# Patient Record
Sex: MALE | Race: WHITE | NOT HISPANIC OR LATINO | Employment: FULL TIME | URBAN - METROPOLITAN AREA
[De-identification: names, ages, dates, MRNs, and addresses within clinical notes are randomized per-mention and may not be internally consistent; named-entity substitution may affect disease eponyms.]

---

## 2022-08-14 ENCOUNTER — APPOINTMENT (EMERGENCY)
Dept: RADIOLOGY | Facility: HOSPITAL | Age: 42
End: 2022-08-14
Payer: COMMERCIAL

## 2022-08-14 ENCOUNTER — HOSPITAL ENCOUNTER (EMERGENCY)
Facility: HOSPITAL | Age: 42
Discharge: HOME | End: 2022-08-14
Attending: EMERGENCY MEDICINE
Payer: COMMERCIAL

## 2022-08-14 VITALS
RESPIRATION RATE: 18 BRPM | HEART RATE: 97 BPM | OXYGEN SATURATION: 98 % | BODY MASS INDEX: 34.65 KG/M2 | TEMPERATURE: 97.4 F | DIASTOLIC BLOOD PRESSURE: 102 MMHG | WEIGHT: 270 LBS | HEIGHT: 74 IN | SYSTOLIC BLOOD PRESSURE: 140 MMHG

## 2022-08-14 DIAGNOSIS — M25.551 RIGHT HIP PAIN: Primary | ICD-10-CM

## 2022-08-14 DIAGNOSIS — M70.60 TROCHANTERIC BURSITIS: ICD-10-CM

## 2022-08-14 DIAGNOSIS — M76.30 IT BAND SYNDROME: ICD-10-CM

## 2022-08-14 PROCEDURE — 99283 EMERGENCY DEPT VISIT LOW MDM: CPT

## 2022-08-14 PROCEDURE — 96372 THER/PROPH/DIAG INJ SC/IM: CPT

## 2022-08-14 PROCEDURE — 99284 EMERGENCY DEPT VISIT MOD MDM: CPT | Performed by: EMERGENCY MEDICINE

## 2022-08-14 PROCEDURE — 73502 X-RAY EXAM HIP UNI 2-3 VIEWS: CPT

## 2022-08-14 PROCEDURE — 20610 DRAIN/INJ JOINT/BURSA W/O US: CPT | Performed by: EMERGENCY MEDICINE

## 2022-08-14 RX ORDER — KETOROLAC TROMETHAMINE 30 MG/ML
15 INJECTION, SOLUTION INTRAMUSCULAR; INTRAVENOUS ONCE
Status: COMPLETED | OUTPATIENT
Start: 2022-08-14 | End: 2022-08-14

## 2022-08-14 RX ORDER — TESTOSTERONE 20.25 MG/1.25G
GEL TOPICAL DAILY
COMMUNITY
Start: 2022-05-01

## 2022-08-14 RX ORDER — LIDOCAINE HYDROCHLORIDE 10 MG/ML
5 INJECTION, SOLUTION EPIDURAL; INFILTRATION; INTRACAUDAL; PERINEURAL ONCE
Status: COMPLETED | OUTPATIENT
Start: 2022-08-14 | End: 2022-08-14

## 2022-08-14 RX ORDER — IBUPROFEN 800 MG/1
800 TABLET ORAL
COMMUNITY
Start: 2021-11-30 | End: 2022-11-30

## 2022-08-14 RX ORDER — LIRAGLUTIDE 6 MG/ML
INJECTION, SOLUTION SUBCUTANEOUS
COMMUNITY
Start: 2022-05-01 | End: 2023-05-29

## 2022-08-14 RX ORDER — TRIAMCINOLONE ACETONIDE 40 MG/ML
40 INJECTION, SUSPENSION INTRA-ARTICULAR; INTRAMUSCULAR ONCE
Status: COMPLETED | OUTPATIENT
Start: 2022-08-14 | End: 2022-08-14

## 2022-08-14 RX ORDER — CYCLOBENZAPRINE HCL 5 MG
1 TABLET ORAL 3 TIMES DAILY PRN
COMMUNITY
Start: 2022-05-01 | End: 2023-05-01

## 2022-08-14 RX ADMIN — KETOROLAC TROMETHAMINE 15 MG: 30 INJECTION, SOLUTION INTRAMUSCULAR; INTRAVENOUS at 14:26

## 2022-08-14 RX ADMIN — TRIAMCINOLONE ACETONIDE 40 MG: 40 INJECTION, SUSPENSION INTRA-ARTICULAR; INTRAMUSCULAR at 14:26

## 2022-08-14 RX ADMIN — LIDOCAINE HYDROCHLORIDE 5 ML: 10 INJECTION, SOLUTION EPIDURAL; INFILTRATION; INTRACAUDAL at 14:26

## 2022-08-14 NOTE — ED PROVIDER NOTES
History  Chief Complaint   Patient presents with    Hip Pain     Patient presents to the ED with c o right hip pain, denies injury and states he has been dealing with the pain for over a year  Previously x rayed  States pain worsened today and has taken ibuprofen and flexeril and applied lidocaine patch with no relief      53-year-old male history of diabetes presenting due to right hip pain  Patient states he has been dealing with this pain for a year and seems to worsened over the past week  Pain goes from the lateral aspect of his right upper leg and at times he feels it on the right side his right leg  Has taken some over-the-counter medication with minimal relief  Denies any injury or trauma  Denies any fever or chills  Denies any pain radiating to the groin testicle or penis  Denies any numbness or tingling in extremities  Prior to Admission Medications   Prescriptions Last Dose Informant Patient Reported? Taking? Dapagliflozin Propanediol 10 MG TABS   Yes Yes   Sig: Take 10 mg by mouth daily   cyclobenzaprine (FLEXERIL) 5 mg tablet   Yes Yes   Sig: Take 1 tablet by mouth 3 (three) times a day as needed   ibuprofen (MOTRIN) 800 mg tablet   Yes Yes   Sig: Take 800 mg by mouth   liraglutide (Saxenda) injection   Yes Yes   Sig: Inject under the skin   testosterone (ANDROGEL) 1 62 %   Yes Yes   Sig: Place on the skin daily      Facility-Administered Medications: None       History reviewed  No pertinent past medical history  History reviewed  No pertinent surgical history  History reviewed  No pertinent family history  I have reviewed and agree with the history as documented  E-Cigarette/Vaping     E-Cigarette/Vaping Substances     Social History     Tobacco Use    Smoking status: Never Smoker   Substance Use Topics    Alcohol use: Not Currently    Drug use: Not Currently       Review of Systems   Constitutional: Negative for fatigue and fever     HENT: Negative for congestion and trouble swallowing  Respiratory: Negative for cough, chest tightness and shortness of breath  Cardiovascular: Negative for chest pain  Gastrointestinal: Negative for abdominal pain, diarrhea, nausea and vomiting  Genitourinary: Negative for flank pain  Musculoskeletal: Negative for back pain and gait problem  Right hip pain   Skin: Negative for rash and wound  Neurological: Negative for syncope and headaches  Psychiatric/Behavioral: Negative for agitation  All other systems reviewed and are negative  Physical Exam  Physical Exam  Vitals and nursing note reviewed  Constitutional:       Appearance: He is well-developed  He is not diaphoretic  HENT:      Head: Normocephalic and atraumatic  Right Ear: External ear normal       Left Ear: External ear normal    Eyes:      General:         Right eye: No discharge  Left eye: No discharge  Conjunctiva/sclera: Conjunctivae normal    Neck:      Vascular: No JVD  Trachea: No tracheal deviation  Cardiovascular:      Rate and Rhythm: Normal rate and regular rhythm  Heart sounds: Normal heart sounds  Pulmonary:      Effort: Pulmonary effort is normal       Breath sounds: Normal breath sounds  No wheezing  Abdominal:      General: There is no distension  Musculoskeletal:         General: Tenderness present  No swelling  Normal range of motion  Cervical back: Normal range of motion  Comments: Tenderness over right greater trochanter  Negative Herrera test   Skin:     General: Skin is warm and dry  Findings: No rash  Neurological:      Mental Status: He is alert and oriented to person, place, and time  Sensory: No sensory deficit  Motor: No weakness     Psychiatric:         Mood and Affect: Mood normal          Speech: Speech normal          Behavior: Behavior normal          Vital Signs  ED Triage Vitals [08/14/22 1405]   Temperature Pulse Respirations Blood Pressure SpO2   (!) 97 4 °F (36 3 °C) 97 18 (!) 140/102 98 %      Temp Source Heart Rate Source Patient Position - Orthostatic VS BP Location FiO2 (%)   Temporal Monitor Sitting Right arm --      Pain Score       9           Vitals:    08/14/22 1405   BP: (!) 140/102   Pulse: 97   Patient Position - Orthostatic VS: Sitting         Visual Acuity      ED Medications  Medications   ketorolac (TORADOL) injection 15 mg (15 mg Intramuscular Given 8/14/22 1426)   triamcinolone acetonide (KENALOG-40) 40 mg/mL injection 40 mg (40 mg Intra-articular Given by Other 8/14/22 1426)   lidocaine (PF) (XYLOCAINE-MPF) 1 % injection 5 mL (5 mL Infiltration Given by Other 8/14/22 1426)       Diagnostic Studies  Results Reviewed     None                 XR hip/pelv 2-3 vws right if performed   ED Interpretation by Cata Rudd DO (08/14 1431)   No acute fracture or dislocation      Final Result by Madonna Aldrich MD (08/14 2133)         1  No acute osseous abnormality  2   Mild degenerative changes both hips  Workstation performed: [id number]                    Procedures  Procedures      Trochanteric bursa injection 8/14/22 at 02:30pm:    Dose:        1%    Lidocaine      3 mL     Steroid 40mg/mL Triamcinolone acetonide        1ml    Procedure: The area was prepped in the usual sterile manner  The needle  was inserted into the affected area and the steroid was injected  There were no complications during this procedure  Followup: The patient tolerated the procedure well without  complications  Standard post-procedure care is explained and return  precautions are given  ED Course                                             MDM  Number of Diagnoses or Management Options  IT band syndrome  Right hip pain  Trochanteric bursitis  Diagnosis management comments: Discussed option for Toradol administration plus orthopedic follow-up vs bursitis injection at this time and patient requested injection  No major acute abnormality seen on x-ray  Suspect trochanteric bursitis with ITB band syndrome  Patient does have well-controlled diabetes and understands that there is risk for elevation of sugars  Also understands that he should not have another injection at this site for at least 3 months  Procedure performed without complication  Provided contact information for Orthopedics  Discharged stable condition with return precautions      Disposition  Final diagnoses:   Right hip pain   Trochanteric bursitis   IT band syndrome     Time reflects when diagnosis was documented in both MDM as applicable and the Disposition within this note     Time User Action Codes Description Comment    8/14/2022  2:38 PM Tonny Wilson Add [M25 551] Right hip pain     8/14/2022  2:38 PM Curtis Balderas Add [M70 60] Trochanteric bursitis     8/14/2022  2:38 PM Tonny Wilson Add [M76 30] IT band syndrome       ED Disposition     ED Disposition   Discharge    Condition   Stable    Date/Time   Sun Aug 14, 2022  2:39 PM    Comment   Felipe Blackwell discharge to home/self care                 Follow-up Information     Follow up With Specialties Details Why Contact Info Additional Information    [facility] [place] [address] [facility]   [address] [place] [address] [phone]  [phone] [address], [facility] [address], [place], [address]    [address] [facility]   [address] [place] [address] [phone]  [address] [facility], [address] [place] [address], [facility] [address]          Discharge Medication List as of 8/14/2022  2:40 PM      CONTINUE these medications which have NOT CHANGED    Details   cyclobenzaprine (FLEXERIL) 5 mg tablet Take 1 tablet by mouth 3 (three) times a day as needed, Starting Sun 5/1/2022, Until Mon 5/1/2023 at 2359, Historical Med      Dapagliflozin Propanediol 10 MG TABS Take 10 mg by mouth daily, Starting Sun 5/1/2022, Until Mon 5/1/2023, Historical Med      ibuprofen (MOTRIN) 800 mg tablet Take 800 mg by mouth, Starting Tue 11/30/2021, Until Wed 11/30/2022 at 2359, Historical Med      liraglutide (Saxenda) injection Inject under the skin, Starting Sun 5/1/2022, Until Mon 5/29/2023 at 2359, Historical Med      testosterone (ANDROGEL) 1 62 % Place on the skin daily, Starting Sun 5/1/2022, Historical Med             No discharge procedures on file      PDMP Review     None          ED Provider  Electronically Signed by           Javad Laura DO  08/14/22 1566

## 2022-08-14 NOTE — DISCHARGE INSTRUCTIONS
You received a steroid injection of your trochanteric bursa today  You may continue to use nsaids and lidoderm patches as directed  Schedule an appointment with orthopedics and PT  Contact information provided

## 2023-01-09 ENCOUNTER — OFFICE VISIT (OUTPATIENT)
Dept: FAMILY MEDICINE CLINIC | Facility: HOSPITAL | Age: 43
End: 2023-01-09

## 2023-01-09 VITALS
HEIGHT: 74 IN | WEIGHT: 266 LBS | OXYGEN SATURATION: 97 % | DIASTOLIC BLOOD PRESSURE: 80 MMHG | HEART RATE: 108 BPM | BODY MASS INDEX: 34.14 KG/M2 | SYSTOLIC BLOOD PRESSURE: 102 MMHG

## 2023-01-09 DIAGNOSIS — Z13.220 SCREENING FOR LIPID DISORDERS: ICD-10-CM

## 2023-01-09 DIAGNOSIS — Z23 NEED FOR INFLUENZA VACCINATION: ICD-10-CM

## 2023-01-09 DIAGNOSIS — Z13.29 SCREENING FOR THYROID DISORDER: ICD-10-CM

## 2023-01-09 DIAGNOSIS — E29.1 TESTICULAR HYPOFUNCTION: ICD-10-CM

## 2023-01-09 DIAGNOSIS — E11.65 TYPE 2 DIABETES MELLITUS WITH HYPERGLYCEMIA, WITHOUT LONG-TERM CURRENT USE OF INSULIN (HCC): Primary | ICD-10-CM

## 2023-01-09 DIAGNOSIS — Z13.0 SCREENING FOR DEFICIENCY ANEMIA: ICD-10-CM

## 2023-01-09 PROBLEM — K76.0 NAFL (NONALCOHOLIC FATTY LIVER): Status: ACTIVE | Noted: 2023-01-09

## 2023-01-09 PROBLEM — G89.29 CHRONIC BILATERAL LOW BACK PAIN WITHOUT SCIATICA: Status: ACTIVE | Noted: 2023-01-09

## 2023-01-09 PROBLEM — M54.50 CHRONIC BILATERAL LOW BACK PAIN WITHOUT SCIATICA: Status: ACTIVE | Noted: 2023-01-09

## 2023-01-09 PROBLEM — E55.9 VITAMIN D DEFICIENCY: Status: ACTIVE | Noted: 2023-01-09

## 2023-01-09 PROBLEM — Z87.442 HISTORY OF NEPHROLITHIASIS: Status: ACTIVE | Noted: 2023-01-09

## 2023-01-09 LAB — SL AMB POCT HEMOGLOBIN AIC: 12.4 (ref ?–6.5)

## 2023-01-09 RX ORDER — TESTOSTERONE 20.25 MG/1.25G
20.25 GEL TOPICAL DAILY
Qty: 90 PACKET | Refills: 0 | Status: SHIPPED | OUTPATIENT
Start: 2023-01-09 | End: 2023-01-19 | Stop reason: SDUPTHER

## 2023-01-09 RX ORDER — LIRAGLUTIDE 6 MG/ML
3 INJECTION, SOLUTION SUBCUTANEOUS DAILY
Qty: 15 ML | Refills: 12 | Status: SHIPPED | OUTPATIENT
Start: 2023-01-09 | End: 2023-01-19 | Stop reason: SDUPTHER

## 2023-01-09 NOTE — PROGRESS NOTES
[facility]    Assessment/Plan:      Diagnosis ICD-10-CM Associated Orders   1  Need for influenza vaccination  Z23 influenza vaccine, quadrivalent, recombinant, PF, 0 5 mL, for patients 18 yr+ (FLUBLOK)      2  Screening for lipid disorders  Z13 220 Lipid panel      3  Screening for deficiency anemia  Z13 0 CBC and differential      4  Screening for thyroid disorder  Z13 29 TSH, 3rd generation with Free T4 reflex      5  Testicular hypofunction  E29 1 Testosterone     testosterone (ANDROGEL) 1 62 %      6  Type 2 diabetes mellitus with hyperglycemia, without long-term current use of insulin (HCC)  E11 65 Comprehensive metabolic panel     POCT hemoglobin A1c     liraglutide (Saxenda) injection     Ambulatory Referral to Endocrinology     dapagliflozin (Farxiga) 10 MG tablet     Microalbumin / creatinine urine ratio        • Obtain fasting bloodwork: no eating for at least 8 hours, drink plenty of water, no caffeine  • Take your blood sugar twice a day and record  Bring to next visit  • Cut down on monster drinks  • Schedule with Endocrinology  Return in about 4 weeks (around 2/6/2023) for Annual physical, F/U after bloodwork with DM2 eye exam   • Patient may call or return to office with any questions or concerns  ______________________________________________________________________  Subjective:     Patient ID: Kyle Smiley is a 36 y o  male  Cesar Starks is here to establish care  He was last seen 4/2021 by Dr Amilcar Robison in [facility]  PHx of DM2 uncontrolled, testicular hypofunction, NAFL, Vitamin D deficiency, and chronic lumbago  He admits to not taking care of himself as he has been more focused on his wife's current medical issues  He reports not taking any of his diabetic medications since November, and ran out of his androgel 2 weeks ago  He reports polydipsia, polyuria    He reports hx ADHD but stopped adderall due to the side effects  He reports managing his ADHD without medication effectively  He works for [employer] in [address] [place] [address]  He notes his eyesight is gradually worsening, 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  Education regarding DM2 and retinopathy discussed/accepted  Drinks 2 monster drinks daily  Social alcohol use                    Dipak Tariq Complaint   Patient presents with   • Establish Care                The following portions of the patient's history were reviewed and updated as appropriate: allergies, current medications, past family history, past medical history, past social history, past surgical history and problem list     Review of Systems   Constitutional: Negative  Negative for activity change, appetite change, chills, fatigue and fever  HENT: Negative  Negative for congestion, ear pain, postnasal drip and sinus pain  Eyes: Positive for visual disturbance  Respiratory: Negative  Negative for cough, chest tightness and shortness of breath  Cardiovascular: Negative  Negative for chest pain and leg swelling  Gastrointestinal: Negative  Negative for constipation and diarrhea  Endocrine: Positive for polydipsia and polyuria  Genitourinary: Negative  Negative for dysuria  Nocturia x3 nightly  Musculoskeletal: Negative  Skin: Negative  Allergic/Immunologic: Negative  Negative for immunocompromised state  Neurological: Negative  Negative for dizziness and light-headedness  Hematological: Negative  Psychiatric/Behavioral: Positive for sleep disturbance  Objective:      Vitals:    01/09/23 1348   BP: 102/80   Pulse: (!) 108   SpO2: 97%      Physical Exam  Vitals and nursing note reviewed  Constitutional:       Appearance: Normal appearance  He is obese  HENT:      Head: Normocephalic and atraumatic        Right Ear: Tympanic membrane, ear canal and external ear normal       Left Ear: Tympanic membrane, ear canal and external ear normal       Nose: Nose normal       Mouth/Throat:      Mouth: Mucous membranes are moist       Pharynx: Oropharynx is clear  Eyes:      Extraocular Movements: Extraocular movements intact  Conjunctiva/sclera: Conjunctivae normal       Pupils: Pupils are equal, round, and reactive to light  Cardiovascular:      Rate and Rhythm: Normal rate and regular rhythm  Pulses: Normal pulses  Heart sounds: Normal heart sounds  Pulmonary:      Effort: Pulmonary effort is normal       Breath sounds: Normal breath sounds  Abdominal:      General: Bowel sounds are normal       Palpations: Abdomen is soft  Musculoskeletal:         General: Normal range of motion  Cervical back: Normal range of motion and neck supple  Skin:     General: Skin is warm and dry  Neurological:      General: No focal deficit present  Mental Status: He is alert and oriented to person, place, and time  Psychiatric:         Mood and Affect: Mood normal          Behavior: Behavior normal          Thought Content: Thought content normal          Judgment: Judgment normal            Portions of the record may have been created with voice recognition software  Occasional wrong word or "sound alike" substitutions may have occurred due to the inherent limitations of voice recognition software  Please review the chart carefully and recognize, using context, where substitutions/typographical errors may have occurred

## 2023-01-09 NOTE — PATIENT INSTRUCTIONS
Obtain fasting bloodwork: no eating for at least 8 hours, drink plenty of water, no caffeine  Take your blood sugar twice a day and record    Bring to next visit  Cut down on monster drinks  Schedule with Endocrinology

## 2023-01-10 ENCOUNTER — TELEPHONE (OUTPATIENT)
Dept: ENDOCRINOLOGY | Facility: CLINIC | Age: 43
End: 2023-01-10

## 2023-01-17 ENCOUNTER — TELEPHONE (OUTPATIENT)
Dept: FAMILY MEDICINE CLINIC | Facility: HOSPITAL | Age: 43
End: 2023-01-17

## 2023-01-17 NOTE — TELEPHONE ENCOUNTER
Patient states that Genamayte Wtason is too expensive  He is asking if something else can be prescribed, he suggested Metformin  Patient also states that medications were all sent to the wrong pharmacy  He now uses [facility] [address]  Can Testosterone and Saxenda be re sent with along with new medication to replace Gena Watson  He also states that he has an abscess in his mouth but cannot get in to the dentist until next week  He is asking for an RX for antibiotic  Also prescriptions need to be for 90 days

## 2023-01-18 ENCOUNTER — TELEPHONE (OUTPATIENT)
Dept: FAMILY MEDICINE CLINIC | Facility: HOSPITAL | Age: 43
End: 2023-01-18

## 2023-01-18 LAB
ALBUMIN SERPL-MCNC: 4.2 G/DL (ref 3.6–5.1)
ALBUMIN/GLOB SERPL: 1.6 (CALC) (ref 1–2.5)
ALP SERPL-CCNC: 67 U/L (ref 36–130)
ALT SERPL-CCNC: 24 U/L (ref 9–46)
AST SERPL-CCNC: 18 U/L (ref 10–40)
BASOPHILS # BLD AUTO: 32 CELLS/UL (ref 0–200)
BASOPHILS NFR BLD AUTO: 0.6 %
BILIRUB SERPL-MCNC: 0.9 MG/DL (ref 0.2–1.2)
BUN SERPL-MCNC: 16 MG/DL (ref 7–25)
BUN/CREAT SERPL: ABNORMAL (CALC) (ref 6–22)
CALCIUM SERPL-MCNC: 9.4 MG/DL (ref 8.6–10.3)
CHLORIDE SERPL-SCNC: 100 MMOL/L (ref 98–110)
CHOLEST SERPL-MCNC: 206 MG/DL
CHOLEST/HDLC SERPL: 4 (CALC)
CO2 SERPL-SCNC: 30 MMOL/L (ref 20–32)
CREAT SERPL-MCNC: 0.82 MG/DL (ref 0.6–1.29)
EOSINOPHIL # BLD AUTO: 69 CELLS/UL (ref 15–500)
EOSINOPHIL NFR BLD AUTO: 1.3 %
ERYTHROCYTE [DISTWIDTH] IN BLOOD BY AUTOMATED COUNT: 11.3 % (ref 11–15)
GFR/BSA.PRED SERPLBLD CYS-BASED-ARV: 112 ML/MIN/1.73M2
GLOBULIN SER CALC-MCNC: 2.6 G/DL (CALC) (ref 1.9–3.7)
GLUCOSE SERPL-MCNC: 299 MG/DL (ref 65–99)
HCT VFR BLD AUTO: 46.2 % (ref 38.5–50)
HDLC SERPL-MCNC: 51 MG/DL
HGB BLD-MCNC: 15.4 G/DL (ref 13.2–17.1)
LDLC SERPL CALC-MCNC: 127 MG/DL (CALC)
LYMPHOCYTES # BLD AUTO: 2067 CELLS/UL (ref 850–3900)
LYMPHOCYTES NFR BLD AUTO: 39 %
MCH RBC QN AUTO: 29.5 PG (ref 27–33)
MCHC RBC AUTO-ENTMCNC: 33.3 G/DL (ref 32–36)
MCV RBC AUTO: 88.5 FL (ref 80–100)
MONOCYTES # BLD AUTO: 451 CELLS/UL (ref 200–950)
MONOCYTES NFR BLD AUTO: 8.5 %
NEUTROPHILS # BLD AUTO: 2682 CELLS/UL (ref 1500–7800)
NEUTROPHILS NFR BLD AUTO: 50.6 %
NONHDLC SERPL-MCNC: 155 MG/DL (CALC)
PLATELET # BLD AUTO: 276 THOUSAND/UL (ref 140–400)
PMV BLD REES-ECKER: 10.2 FL (ref 7.5–12.5)
POTASSIUM SERPL-SCNC: 4.1 MMOL/L (ref 3.5–5.3)
PROT SERPL-MCNC: 6.8 G/DL (ref 6.1–8.1)
RBC # BLD AUTO: 5.22 MILLION/UL (ref 4.2–5.8)
SODIUM SERPL-SCNC: 137 MMOL/L (ref 135–146)
TESTOST SERPL-MCNC: 329 NG/DL (ref 250–827)
TRIGL SERPL-MCNC: 160 MG/DL
TSH SERPL-ACNC: 1.89 MIU/L (ref 0.4–4.5)
WBC # BLD AUTO: 5.3 THOUSAND/UL (ref 3.8–10.8)

## 2023-01-19 DIAGNOSIS — E11.65 TYPE 2 DIABETES MELLITUS WITH HYPERGLYCEMIA, WITHOUT LONG-TERM CURRENT USE OF INSULIN (HCC): ICD-10-CM

## 2023-01-19 DIAGNOSIS — E29.1 TESTICULAR HYPOFUNCTION: ICD-10-CM

## 2023-01-19 RX ORDER — TESTOSTERONE 20.25 MG/1.25G
20.25 GEL TOPICAL DAILY
Qty: 90 PACKET | Refills: 0 | Status: SHIPPED | OUTPATIENT
Start: 2023-01-19

## 2023-01-19 RX ORDER — METFORMIN HYDROCHLORIDE 500 MG/1
1000 TABLET, EXTENDED RELEASE ORAL
Qty: 90 TABLET | Refills: 3 | Status: SHIPPED | OUTPATIENT
Start: 2023-01-19 | End: 2023-01-25 | Stop reason: SDUPTHER

## 2023-01-19 RX ORDER — LIRAGLUTIDE 6 MG/ML
3 INJECTION, SOLUTION SUBCUTANEOUS DAILY
Qty: 45 ML | Refills: 3 | Status: SHIPPED | OUTPATIENT
Start: 2023-01-19 | End: 2023-01-25

## 2023-01-25 ENCOUNTER — CONSULT (OUTPATIENT)
Dept: ENDOCRINOLOGY | Facility: HOSPITAL | Age: 43
End: 2023-01-25

## 2023-01-25 VITALS
HEART RATE: 100 BPM | BODY MASS INDEX: 33.62 KG/M2 | DIASTOLIC BLOOD PRESSURE: 70 MMHG | SYSTOLIC BLOOD PRESSURE: 118 MMHG | WEIGHT: 262 LBS | HEIGHT: 74 IN

## 2023-01-25 DIAGNOSIS — E11.65 TYPE 2 DIABETES MELLITUS WITH HYPERGLYCEMIA, WITHOUT LONG-TERM CURRENT USE OF INSULIN (HCC): ICD-10-CM

## 2023-01-25 DIAGNOSIS — K76.0 NAFL (NONALCOHOLIC FATTY LIVER): ICD-10-CM

## 2023-01-25 DIAGNOSIS — E78.2 MIXED HYPERLIPIDEMIA: ICD-10-CM

## 2023-01-25 DIAGNOSIS — E11.42 TYPE 2 DIABETES MELLITUS WITH DIABETIC POLYNEUROPATHY, WITHOUT LONG-TERM CURRENT USE OF INSULIN (HCC): Primary | ICD-10-CM

## 2023-01-25 DIAGNOSIS — E11.65 POORLY CONTROLLED TYPE 2 DIABETES MELLITUS (HCC): ICD-10-CM

## 2023-01-25 RX ORDER — LANCETS
EACH MISCELLANEOUS
Qty: 200 EACH | Refills: 1 | Status: SHIPPED | OUTPATIENT
Start: 2023-01-25 | End: 2023-01-26 | Stop reason: SDUPTHER

## 2023-01-25 RX ORDER — BLOOD SUGAR DIAGNOSTIC
STRIP MISCELLANEOUS
Qty: 200 EACH | Refills: 0 | Status: SHIPPED | OUTPATIENT
Start: 2023-01-25 | End: 2023-01-26 | Stop reason: SDUPTHER

## 2023-01-25 RX ORDER — METFORMIN HYDROCHLORIDE 500 MG/1
2000 TABLET, EXTENDED RELEASE ORAL
Qty: 360 TABLET | Refills: 1 | Status: SHIPPED | OUTPATIENT
Start: 2023-01-25 | End: 2023-01-27 | Stop reason: SDUPTHER

## 2023-01-25 RX ORDER — FLASH GLUCOSE SENSOR
1 KIT MISCELLANEOUS
Qty: 6 EACH | Refills: 1 | Status: SHIPPED | OUTPATIENT
Start: 2023-01-25

## 2023-01-25 RX ORDER — ATORVASTATIN CALCIUM 20 MG/1
20 TABLET, FILM COATED ORAL DAILY
Qty: 90 TABLET | Refills: 1 | Status: SHIPPED | OUTPATIENT
Start: 2023-01-25 | End: 2023-01-27 | Stop reason: SDUPTHER

## 2023-01-25 RX ORDER — BLOOD-GLUCOSE METER
EACH MISCELLANEOUS 2 TIMES DAILY
Qty: 1 KIT | Refills: 0 | Status: SHIPPED | OUTPATIENT
Start: 2023-01-25

## 2023-01-25 NOTE — PROGRESS NOTES
New Patient Consult Note      Chief Complaint   Patient presents with   • Diabetes Type 2      Referring Provider  Anastasia Perez, [address] [facility],  [address] [place] [address]     History of Present Illness:   Christian Blankenship is a 36 y o  male with a history of type 2 diabetes since last 5 years poorly controlled with complications of mild neuropathy and NAFLD who presents today for diabetes management  Patient reports he has had poor diabetic control in the past as he was busy with taking care of his ill wife the last few years and therefore lost track of his care  Now interested in getting his BG better  Reports wife as Progets disease? Patient was first diagnosed with T2DM 6-5 years  Control since diagnosis- Does not have frequent physician follow up thus limited information  HbA1C in 2021 was 12 3% and currently remains at 12%  Medications tried thus far- Says was initially not on any medications and started on Arlys Gift about 2 years ago- took it for 1 year and felt his BG were not improving and the cost of medication was too high- therefore d/c this 6 months ago  Also reports being on Trulicity also for 1 year but felt his BG were very labile on this and therefore stopped taking this as well for the last 6 months  - Patient not on any medication for the last 6 months    Current regime- Started on Metformin 1000mg daily by PCP last week- only took this for the last 2 days, also prescribed liraglutide but hasn't started taking this  BG control:- Checks BG twice day  Fasting 339, Bedtime 400  Reports started checking BG only recently  Hypoglycemic episodes:None   Hyperglycemia symptoms:- Does report polyuria, polydipsia  Denies any chest pain, dyspnea or TIAs,no numbness, tingling or pain in extremities  Does report having some blurry vision lately  Diet- Doesn't eat bk, Eats lunch and Dinner  Lunch- Meat left over, Dinner- pasta sometimes     (doesn't follow a diabetic diet), eats junk food here and there  Eats fast food often  Did keto 1 5 years ago and felt BG was better then  Activity- unable to do much since works a lot, has 2 jobs to make ends meet  Weight- was 333lbs in 2020 and currently 262lbs  Opthamology: Says- gets blurry vision now  hasnt seen opthalmology in 2 years  Hx of hyperlipidemia- Denies, Not on statin  Hx of hypertension- Denies, not on any medication  Last Lipid:- 01/23-   Last urine microalbumin:- None on file     Social Hx:- 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, Does not drinking, no smoking, no other drugs  - Patient reports he and his wife are Planning to travel arround and go on a road trip for 1 year and periodically return to [place] every 2 months for his wife's routine follow up  Family HX:- Father had T2DM- lost his limbs/also alcoholic  Patient Active Problem List   Diagnosis   • Type 2 diabetes mellitus with hyperglycemia, without long-term current use of insulin ([facility] [address] )   • Testicular hypofunction   • NAFL (nonalcoholic fatty liver)   • Vitamin D deficiency   • History of nephrolithiasis   • Chronic bilateral low back pain without sciatica      Past Medical History:   Diagnosis Date   • Diabetes mellitus ([facility] [address] )    • Low testosterone       History reviewed  No pertinent surgical history     Family History   Problem Relation Age of Onset   • COPD Mother    • Colon cancer Mother    • Diabetes Father    • Heart disease Father    • Dementia Father    • Breast cancer Sister      Social History     Tobacco Use   • Smoking status: Never   • Smokeless tobacco: Not on file   Substance Use Topics   • Alcohol use: Not Currently     No Known Allergies      Current Outpatient Medications:   •  Apple Cider Vinegar 188 MG CAPS, Take by mouth in the morning, Disp: , Rfl:   •  Blood Glucose Monitoring Suppl (OneTouch Verio) w/Device KIT, Use 2 (two) times a day, Disp: 1 kit, Rfl: 0  •  Continuous Blood Gluc Sensor (FreeStyle [place] 2 Sensor) MISC, Use 1 each every 14 (fourteen) days, Disp: 6 each, Rfl: 1  •  glucose blood (OneTouch Verio) test strip, Use as instructed, Disp: 200 each, Rfl: 0  •  Lancets (onetouch ultrasoft) lancets, Use as instructed, Disp: 200 each, Rfl: 1  •  liraglutide (SAXENDA) injection, Inject 0 1 mL (0 6 mg total) under the skin daily for 7 days, THEN 0 2 mL (1 2 mg total) daily for 7 days, THEN 0 3 mL (1 8 mg total) daily for 7 days, THEN 0 4 mL (2 4 mg total) daily for 7 days, THEN 0 5 mL (3 mg total) daily for 7 days  , Disp: 10 5 mL, Rfl: 0  •  metFORMIN (GLUCOPHAGE-XR) 500 mg 24 hr tablet, Take 4 tablets (2,000 mg total) by mouth daily with dinner, Disp: 360 tablet, Rfl: 1  •  Multiple Vitamins-Minerals (MULTIVITAMIN ADULTS PO), Take by mouth, Disp: , Rfl:   •  testosterone (ANDROGEL) 1 62 %, Apply 1 packet (20 25 mg total) topically daily, Disp: 90 packet, Rfl: 0  Review of Systems   Constitutional: Positive for fatigue  Negative for diaphoresis and unexpected weight change  Eyes: Positive for visual disturbance  Respiratory: Negative for shortness of breath  Cardiovascular: Negative for chest pain and palpitations  Gastrointestinal: Negative for abdominal pain, constipation and diarrhea  Endocrine: Positive for polydipsia and polyuria  Negative for polyphagia  Skin: Negative for rash  Neurological: Negative for headaches  Psychiatric/Behavioral: Negative  Physical Exam:  Body mass index is 33 64 kg/m²  /70   Pulse 100   Ht 6' 2" (1 88 m)   Wt 119 kg (262 lb)   BMI 33 64 kg/m²    Wt Readings from Last 3 Encounters:   01/25/23 119 kg (262 lb)   01/09/23 121 kg (266 lb)   08/14/22 122 kg (270 lb)       Physical Exam  Constitutional:       Appearance: Normal appearance  He is obese  Cardiovascular:      Rate and Rhythm: Normal rate and regular rhythm  Pulses: Normal pulses  Pulmonary:      Effort: Pulmonary effort is normal    Abdominal:      General: Abdomen is flat  Palpations: Abdomen is soft  Skin:     General: Skin is warm  Capillary Refill: Capillary refill takes less than 2 seconds  Neurological:      General: No focal deficit present  Mental Status: He is alert  Psychiatric:         Mood and Affect: Mood normal        Diabetic Foot Exam    Labs:    Latest Reference Range & Units 01/18/23 08:13   Sodium 135 - 146 mmol/L 137   Potassium 3 5 - 5 3 mmol/L 4 1   Chloride 98 - 110 mmol/L 100   CO2 20 - 32 mmol/L 30   BUN 7 - 25 mg/dL 16   Creatinine 0 60 - 1 29 mg/dL 0 82   SL AMB BUN/CREATININE RATIO 6 - 22 (calc) NOT APPLICABLE   Glucose, Random 65 - 99 mg/dL 299 (H)   Calcium 8 6 - 10 3 mg/dL 9 4   AST 10 - 40 U/L 18   ALT 9 - 46 U/L 24   Alkaline Phosphatase 36 - 130 U/L 67   Total Protein 6 1 - 8 1 g/dL 6 8   Albumin 3 6 - 5 1 g/dL 4 2   TOTAL BILIRUBIN 0 2 - 1 2 mg/dL 0 9   eGFR > OR = 60 mL/min/1 73m2 112   Albumin/Globulin Ratio 1 0 - 2 5 (calc) 1 6   Cholesterol <200 mg/dL 206 (H)   Triglycerides <150 mg/dL 160 (H)   HDL > OR = 40 mg/dL 51   Non-HDL Cholesterol <130 mg/dL (calc) 155 (H)   LDL Calculated mg/dL (calc) 127 (H)   Chol HDLC Ratio <5 0 (calc) 4 0   (H): Data is abnormally high       Latest Reference Range & Units 01/09/23 17:30   Hemoglobin A1C 6 5  12 4 !   !: Data is abnormal          Hemoglobin A1C     Component 04/19/2021         Hemoglobin A1C 12 3 High          Impression:  1  Type 2 diabetes mellitus with diabetic polyneuropathy, without long-term current use of insulin ([facility] [address] )    2  Type 2 diabetes mellitus with hyperglycemia, without long-term current use of insulin ([facility])    3  NAFL (nonalcoholic fatty liver)    4  Mixed hyperlipidemia    5  Poorly controlled type 2 diabetes mellitus ([address] )       Plan:  Jaskaran Barbosa was seen today for diabetes type 2      Diagnoses and all orders for this visit:    Type 2 diabetes mellitus with diabetic polyneuropathy, without long-term current use of insulin (HCC)    Type 2 diabetes mellitus with hyperglycemia, without long-term current use of insulin ([place] [address] )  -     Ambulatory Referral to Endocrinology  -     Microalbumin / creatinine urine ratio Lab Collect; Future  -     metFORMIN (GLUCOPHAGE-XR) 500 mg 24 hr tablet; Take 4 tablets (2,000 mg total) by mouth daily with dinner  -     liraglutide (SAXENDA) injection; Inject 0 1 mL (0 6 mg total) under the skin daily for 7 days, THEN 0 2 mL (1 2 mg total) daily for 7 days, THEN 0 3 mL (1 8 mg total) daily for 7 days, THEN 0 4 mL (2 4 mg total) daily for 7 days, THEN 0 5 mL (3 mg total) daily for 7 days   -     Blood Glucose Monitoring Suppl (OneTouch Verio) w/Device KIT; Use 2 (two) times a day  -     glucose blood (OneTouch Verio) test strip; Use as instructed  -     Lancets (onetouch ultrasoft) lancets; Use as instructed  -     Continuous Blood Gluc Sensor (FreeStyle [place] 2 Sensor) MISC; Use 1 each every 14 (fourteen) days    NAFL (nonalcoholic fatty liver)    Mixed hyperlipidemia    Poorly controlled type 2 diabetes mellitus ([facility] )      1  Type 2 diabetes mellitus with hyperglycemia, without long-term current use of insulin ([place] [address] )  - Ambulatory Referral to Endocrinology    Patient is a 42yM  With PMHx of T2DM since 6 years poorly controlled with complications of mild neuropathy without any macrovascular complications with other PMHx of Hyperlipidemia and Obesity with BMI 33 who was referred for diabetes management  Poor diabetic management in the past d/t social factors including poor physician follow up, inconsistent care also having to work towards care of his wife rather than patient itself  1) T2DM:- Patients HbA1C has been elevated at 12% range for the past 1 5 years atleast- no previous data  Tried farxiga/trulicity but reports inadequate treatment response on these  Unable to truly verify given the several labs in care   Patient currently having symptoms of overt hyperglycemia with polyuria, polydipsia and has also lost weight unintentionally indicating some cachexia as well  Although believe patient current needs insulin management given reports BG of 300-400 range with HbA1C of 12%, since patient was just recently started on Metformin only- recommend trying to optimize on this and use Liraglutide instead  If inadequate response in the next 2-3 weeks may decide on starting basal insulin  Plan  - Increase metformin to 2000mg daily XR  - Start Liraglutide at dose increaments of 0 6mg, 1 8mg, 2 4mg and 3mg daily- dose escalated every week  - CGM freestyle luiz send for more BG data, if not approved new glucometer also send   - Send BG data in 2 weeks if BG still in 350-400 range will start on low dose basal insulin ~25u daily  Patient knows insulin administration- used to do it for father  - Check BG atleast 2-3x daily- fasting/bedtime and premeal   - Goal HbA1C <7%  - Discussed needs to make dietary changes since diet is not diabetic friendly  Suggested going back to previous diet since reports BG were better on this  Also cautioned against avoiding all carb and eating high fat to avoid issues with worsening hyperlipidemia  High protein, low carb low fat diet recommended  Screening:-   Retinopathy- discussed needs annual screening- will follow  Nephropathy- Will order, none on file  Lipid levels- repeat in 6 months, abnormal  01/23, start lipitor 20mg daily  2) Hyperlipidemia- Start 20mg lipitor, LDL above goal at 127, goal <100, Repeat lipid level in 6 months     Hypogonadism/VitD deficiency- managed by PCP    RTC in 4 weeks     Discussed with the patient and all questioned fully answered  He will call me if any problems arise  I have spent 55 minutes with Patient  today in which greater than 50% of this time was spent in counseling/coordination of care regarding Prognosis, Risks and benefits of tx options, Patient and family education, Importance of tx compliance and Risk factor reductions      Counseled patient on diagnostic results, prognosis, risk and benefit of treatment options, instruction for management, importance of treatment compliance, Risk  factor reduction and impressions      Malachi Cornejo MD

## 2023-01-26 DIAGNOSIS — E11.65 TYPE 2 DIABETES MELLITUS WITH HYPERGLYCEMIA, WITHOUT LONG-TERM CURRENT USE OF INSULIN (HCC): ICD-10-CM

## 2023-01-26 RX ORDER — LANCETS
EACH MISCELLANEOUS
Qty: 200 EACH | Refills: 1 | Status: SHIPPED | OUTPATIENT
Start: 2023-01-26

## 2023-01-26 RX ORDER — BLOOD SUGAR DIAGNOSTIC
STRIP MISCELLANEOUS
Qty: 200 EACH | Refills: 1 | Status: SHIPPED | OUTPATIENT
Start: 2023-01-26

## 2023-01-27 DIAGNOSIS — E78.2 MIXED HYPERLIPIDEMIA: ICD-10-CM

## 2023-01-27 DIAGNOSIS — K76.0 NAFL (NONALCOHOLIC FATTY LIVER): ICD-10-CM

## 2023-01-27 DIAGNOSIS — E11.65 TYPE 2 DIABETES MELLITUS WITH HYPERGLYCEMIA, WITHOUT LONG-TERM CURRENT USE OF INSULIN (HCC): ICD-10-CM

## 2023-01-27 DIAGNOSIS — E11.42 TYPE 2 DIABETES MELLITUS WITH DIABETIC POLYNEUROPATHY, WITHOUT LONG-TERM CURRENT USE OF INSULIN (HCC): ICD-10-CM

## 2023-01-27 DIAGNOSIS — E11.65 POORLY CONTROLLED TYPE 2 DIABETES MELLITUS (HCC): ICD-10-CM

## 2023-01-27 RX ORDER — METFORMIN HYDROCHLORIDE 500 MG/1
2000 TABLET, EXTENDED RELEASE ORAL
Qty: 360 TABLET | Refills: 1 | Status: SHIPPED | OUTPATIENT
Start: 2023-01-27 | End: 2023-04-27

## 2023-01-27 RX ORDER — ATORVASTATIN CALCIUM 20 MG/1
20 TABLET, FILM COATED ORAL DAILY
Qty: 90 TABLET | Refills: 1 | Status: SHIPPED | OUTPATIENT
Start: 2023-01-27

## 2023-01-27 RX ORDER — PEN NEEDLE, DIABETIC 33 GX5/32"
NEEDLE, DISPOSABLE MISCELLANEOUS
Qty: 100 EACH | Refills: 3 | Status: SHIPPED | OUTPATIENT
Start: 2023-01-27

## 2023-03-28 ENCOUNTER — TELEPHONE (OUTPATIENT)
Dept: UROLOGY | Facility: AMBULATORY SURGERY CENTER | Age: 43
End: 2023-03-28

## 2023-03-28 NOTE — TELEPHONE ENCOUNTER
New Patient    What is the reason for the patient’s appointment? Vas consult     What office location does the patient prefer? Imaging/Lab Results:    Do we accept the patient's insurance or is the patient Self-Pay? Yes     Insurance Provider: [address] [place] [address] Alvaro Gomez  Member ID#: [id number]    Has the patient had any previous Urologist(s)? No     Have patient records been requested? If not are records showing in Epic: record in epic     Has the patient had any outside testing done? No     Does the patient have a personal history of cancer?  No

## 2023-05-17 DIAGNOSIS — E11.65 TYPE 2 DIABETES MELLITUS WITH HYPERGLYCEMIA, WITHOUT LONG-TERM CURRENT USE OF INSULIN (HCC): ICD-10-CM

## 2023-05-17 DIAGNOSIS — E11.65 POORLY CONTROLLED TYPE 2 DIABETES MELLITUS (HCC): ICD-10-CM

## 2023-05-17 DIAGNOSIS — E11.65 TYPE 2 DIABETES MELLITUS WITH HYPERGLYCEMIA, WITHOUT LONG-TERM CURRENT USE OF INSULIN (HCC): Primary | ICD-10-CM

## 2023-05-17 DIAGNOSIS — E78.2 MIXED HYPERLIPIDEMIA: ICD-10-CM

## 2023-05-17 DIAGNOSIS — K76.0 NAFL (NONALCOHOLIC FATTY LIVER): ICD-10-CM

## 2023-05-17 DIAGNOSIS — E11.42 TYPE 2 DIABETES MELLITUS WITH DIABETIC POLYNEUROPATHY, WITHOUT LONG-TERM CURRENT USE OF INSULIN (HCC): ICD-10-CM

## 2023-05-17 RX ORDER — METFORMIN HYDROCHLORIDE 500 MG/1
2000 TABLET, EXTENDED RELEASE ORAL
Qty: 360 TABLET | Refills: 1 | Status: SHIPPED | OUTPATIENT
Start: 2023-05-17 | End: 2023-08-15

## 2023-05-17 RX ORDER — ATORVASTATIN CALCIUM 20 MG/1
20 TABLET, FILM COATED ORAL DAILY
Qty: 90 TABLET | Refills: 1 | Status: SHIPPED | OUTPATIENT
Start: 2023-05-17

## 2023-05-17 RX ORDER — BLOOD SUGAR DIAGNOSTIC
STRIP MISCELLANEOUS
Qty: 200 EACH | Refills: 1 | Status: SHIPPED | OUTPATIENT
Start: 2023-05-17

## 2023-05-17 RX ORDER — BLOOD SUGAR DIAGNOSTIC
1 STRIP MISCELLANEOUS
Qty: 100 EACH | Refills: 1 | Status: SHIPPED | OUTPATIENT
Start: 2023-05-17 | End: 2023-05-19

## 2023-05-17 RX ORDER — LANCETS
EACH MISCELLANEOUS
Qty: 200 EACH | Refills: 1 | Status: SHIPPED | OUTPATIENT
Start: 2023-05-17

## 2023-05-17 RX ORDER — PEN NEEDLE, DIABETIC 33 GX5/32"
NEEDLE, DISPOSABLE MISCELLANEOUS
Qty: 100 EACH | Refills: 3 | Status: SHIPPED | OUTPATIENT
Start: 2023-05-17 | End: 2023-05-17

## 2023-05-18 DIAGNOSIS — E11.65 TYPE 2 DIABETES MELLITUS WITH HYPERGLYCEMIA, WITHOUT LONG-TERM CURRENT USE OF INSULIN (HCC): ICD-10-CM

## 2023-05-19 RX ORDER — PEN NEEDLE, DIABETIC 30 GX 1/3"
NEEDLE, DISPOSABLE MISCELLANEOUS
Qty: 100 EACH | Refills: 1 | Status: SHIPPED | OUTPATIENT
Start: 2023-05-19

## 2023-08-24 DIAGNOSIS — E11.65 TYPE 2 DIABETES MELLITUS WITH HYPERGLYCEMIA, WITHOUT LONG-TERM CURRENT USE OF INSULIN (HCC): ICD-10-CM

## 2023-08-24 DIAGNOSIS — E29.1 TESTICULAR HYPOFUNCTION: ICD-10-CM

## 2023-08-25 RX ORDER — TESTOSTERONE 20.25 MG/1.25G
20.25 GEL TOPICAL DAILY
Qty: 90 PACKET | Refills: 0 | Status: SHIPPED | OUTPATIENT
Start: 2023-08-25

## 2023-08-25 RX ORDER — PEN NEEDLE, DIABETIC 30 GX 1/3"
NEEDLE, DISPOSABLE MISCELLANEOUS
Qty: 100 EACH | Refills: 0 | OUTPATIENT
Start: 2023-08-25

## 2024-07-08 ENCOUNTER — TELEPHONE (OUTPATIENT)
Dept: FAMILY MEDICINE CLINIC | Facility: HOSPITAL | Age: 44
End: 2024-07-08